# Patient Record
Sex: MALE | Race: WHITE | NOT HISPANIC OR LATINO | URBAN - METROPOLITAN AREA
[De-identification: names, ages, dates, MRNs, and addresses within clinical notes are randomized per-mention and may not be internally consistent; named-entity substitution may affect disease eponyms.]

---

## 2024-06-05 ENCOUNTER — OFFICE VISIT (OUTPATIENT)
Dept: URBAN - METROPOLITAN AREA CLINIC 105 | Facility: CLINIC | Age: 79
End: 2024-06-05

## 2024-06-07 ENCOUNTER — OFFICE VISIT (OUTPATIENT)
Dept: URBAN - METROPOLITAN AREA CLINIC 105 | Facility: CLINIC | Age: 79
End: 2024-06-07

## 2024-06-07 RX ORDER — METHYLPREDNISOLONE 4 MG/1
TABLET ORAL
Qty: 21 TABLET | Status: ACTIVE | COMMUNITY

## 2024-06-13 ENCOUNTER — OFFICE VISIT (OUTPATIENT)
Dept: URBAN - METROPOLITAN AREA CLINIC 105 | Facility: CLINIC | Age: 79
End: 2024-06-13
Payer: MEDICARE

## 2024-06-13 VITALS
HEIGHT: 64 IN | BODY MASS INDEX: 29.16 KG/M2 | WEIGHT: 170.8 LBS | TEMPERATURE: 98.1 F | SYSTOLIC BLOOD PRESSURE: 183 MMHG | HEART RATE: 89 BPM | DIASTOLIC BLOOD PRESSURE: 90 MMHG

## 2024-06-13 DIAGNOSIS — R13.19 ESOPHAGEAL DYSPHAGIA: ICD-10-CM

## 2024-06-13 DIAGNOSIS — R05.8 OTHER COUGH: ICD-10-CM

## 2024-06-13 DIAGNOSIS — Z87.19 HISTORY OF ESOPHAGEAL ULCER: ICD-10-CM

## 2024-06-13 DIAGNOSIS — R93.89 IMAGING ABNORMALITY: ICD-10-CM

## 2024-06-13 DIAGNOSIS — K21.00 GASTROESOPHAGEAL REFLUX DISEASE WITH ESOPHAGITIS WITHOUT HEMORRHAGE: ICD-10-CM

## 2024-06-13 PROBLEM — 266433003: Status: ACTIVE | Noted: 2024-06-13

## 2024-06-13 PROCEDURE — 99204 OFFICE O/P NEW MOD 45 MIN: CPT | Performed by: INTERNAL MEDICINE

## 2024-06-13 RX ORDER — METHYLPREDNISOLONE 4 MG/1
TABLET ORAL
Qty: 21 TABLET | Status: ON HOLD | COMMUNITY

## 2024-06-13 RX ORDER — PERPHENAZINE/AMITRIPTYLINE HCL 2 MG-10 MG
AS DIRECTED TABLET ORAL
Status: ACTIVE | COMMUNITY

## 2024-06-13 NOTE — HPI-TODAY'S VISIT:
maryjane last seen by me on 10/29/13 w/ constipation/change in bowel habit.    Today, he says over the last 20-30 years, he's had intermittent heartburn, choking when swallowing tuna fish, food coming back up after swallowing, and cold weather asthma. A couple weeks ago he started excessively coughing throughout the day, middle of the night along w/ regurgitation and sour taste in mouth after he eat raw lilia, raw garlic w/ tumeric, waldrop w/ 10-12 drops of hot sauce. He cut out these foods at the suggestion of his wife. With diet change, most of his symptoms resolved except for an occasional globus sensation that causes a cough and the feeling of saliva going down his throat. He did a swallowing test at [facility] - says the person who conducted the test told him "off the record" that he is fine and nothing was needed. He then got a call from Dr. Hagan telling him that imaging noted stomach "fold thickening" and that he should have an EGD. His EGD is scheduled, but wants to see me for a second opinion to see if an EGD is necessary. He has heartburn a couple times/month. He says the swallowing test noted mild GERD.  Labs 4/11/23 - BMP, vit D all normal.

## 2024-06-14 ENCOUNTER — DASHBOARD ENCOUNTERS (OUTPATIENT)
Age: 79
End: 2024-06-14

## 2024-06-14 PROBLEM — 168501001: Status: ACTIVE | Noted: 2024-06-13

## 2024-07-23 ENCOUNTER — OFFICE VISIT (OUTPATIENT)
Dept: URBAN - METROPOLITAN AREA CLINIC 105 | Facility: CLINIC | Age: 79
End: 2024-07-23